# Patient Record
Sex: FEMALE | Race: WHITE | NOT HISPANIC OR LATINO | Employment: FULL TIME | URBAN - NONMETROPOLITAN AREA
[De-identification: names, ages, dates, MRNs, and addresses within clinical notes are randomized per-mention and may not be internally consistent; named-entity substitution may affect disease eponyms.]

---

## 2023-08-18 ENCOUNTER — OFFICE VISIT (OUTPATIENT)
Dept: OBSTETRICS AND GYNECOLOGY | Facility: CLINIC | Age: 33
End: 2023-08-18
Payer: COMMERCIAL

## 2023-08-18 VITALS
BODY MASS INDEX: 27.44 KG/M2 | WEIGHT: 196 LBS | SYSTOLIC BLOOD PRESSURE: 118 MMHG | HEIGHT: 71 IN | DIASTOLIC BLOOD PRESSURE: 64 MMHG

## 2023-08-18 DIAGNOSIS — Z30.09 ENCOUNTER FOR FEMALE FAMILY PLANNING COUNSELING: Primary | ICD-10-CM

## 2023-08-18 DIAGNOSIS — N92.1 MENORRHAGIA WITH IRREGULAR CYCLE: ICD-10-CM

## 2023-08-18 DIAGNOSIS — N94.6 DYSMENORRHEA: ICD-10-CM

## 2023-08-18 DIAGNOSIS — Z80.41 FAMILY HISTORY OF OVARIAN CANCER: ICD-10-CM

## 2023-08-18 PROCEDURE — 99204 OFFICE O/P NEW MOD 45 MIN: CPT | Performed by: OBSTETRICS & GYNECOLOGY

## 2023-08-18 RX ORDER — CETIRIZINE HYDROCHLORIDE 10 MG/1
CAPSULE, LIQUID FILLED ORAL
COMMUNITY
Start: 2020-01-01

## 2023-08-21 NOTE — PROGRESS NOTES
Chief Complaint  Consult (Patient wants to discuss family planning. )     History of Present Illness:  Patient is 33 y.o.  who presents to [facility] here as a new patient for discussion of family-planning.  Patient had previously been on Tri-Sprintec for 8 to 9 years.  She has been off of it now for the last 2 years.  She reports her menstrual cycles occur monthly.  She reports they will vary however within a few days.  They are heavy lasting for 5 days.  She does pass some clots.  She also has cramping associated with her menstrual cycles.  She has been trying to conceive since April of this year.  She has done urine ovulatory kits for 2 months.  She does have positive molimina.  She did have recent labs with her primary care provider within the last year.  She is uncertain exactly what labs were checked.  Her significant other is 31 years of age.  He is healthy and has not fathered any children however.  He is on medication for decreased libido.  The patient's last menstrual cycle started on August 15.  She is on her menstrual cycle today.  She reports her last Pap smear was 2021.  She reports it was normal.  Patient does have a family history of ovarian cancer in her mother who was diagnosed with stage Ia ovarian cancer in 2018.    History  Past Medical History:   Diagnosis Date    Abnormal ECG     Dx with POTS    Anxiety     Anxiety    Asthma     Allergy related    Hyperlipidemia     Mild elevation; will bring medical record    Migraine 2022    Last noted 2023    Ovarian cyst 2012    PMS (premenstrual syndrome)     Urinary tract infection     Until 2 years d/t swim and oral contraceptives     Current Outpatient Medications on File Prior to Visit   Medication Sig Dispense Refill    Cetirizine HCl (ZyrTEC Allergy) 10 MG capsule       ubrogepant (Ubrelvy) 100 MG tablet Take 1 tablet at onset of headache, may repeat dose x1 after  "2h.Max: 200 mg/24h.       No current facility-administered medications on file prior to visit.     No Known Allergies  Past Surgical History:   Procedure Laterality Date    WISDOM TOOTH EXTRACTION  2014     Family History   Problem Relation Age of Onset    Ovarian cancer Mother         Tumor removed in 2018; no relapses    Hypertension Mother     Diabetes Father     Hypertension Father     Diabetes Maternal Grandmother     Hypertension Maternal Grandmother     Diabetes Paternal Grandmother     Hypertension Paternal Grandmother     Hypertension Maternal Aunt     Hypertension Paternal Uncle      Social History     Socioeconomic History    Marital status:    Tobacco Use    Smoking status: Never   Substance and Sexual Activity    Alcohol use: Yes     Alcohol/week: 1.0 standard drink     Types: 1 Drinks containing 0.5 oz of alcohol per week     Comment: Maybe 1-2 drinks monthly    Drug use: Never    Sexual activity: Yes     Partners: Male     Birth control/protection: None     Comment: For last four months       Physical Examination:  Vital Signs: /64   Ht 180.3 cm (71\")   Wt 88.9 kg (196 lb)   BMI 27.34 kg/mý     General Appearance: alert, appears stated age, and cooperative  Breasts: Not performed.  Abdomen: no masses, no hepatomegaly, no splenomegaly, soft non-tender, no guarding, and no rebound tenderness  Pelvic: Not performed.    Data Review:  The following data was reviewed by: Maritza Tracey MD on 08/18/2023:     Labs:    Imaging:    Medical Records:  Patient is to sign to obtain a copy of her lab results.    Assessment and Plan   1. Encounter for female family planning counseling  I have discussed with the patient family-planning.  Patient has been informed in timing of intercourse.  Patient will continue with her urine ovulatory kits.  She will follow-up with a serum progesterone level.  We will also schedule transvaginal ultrasound.  Patient is instructed in a prenatal vitamin or multivitamin " with folic acid.  Instructions and precautions have been given.  - US Non-ob Transvaginal; Future  - Progesterone; Future    2. Menorrhagia with irregular cycle  Patient with menorrhagia and irregular cycles.  Patient will follow-up with labs as noted.  She is also designed to obtain a copy of her recent medical records.  - US Non-ob Transvaginal; Future  - Progesterone; Future  - CBC & Differential  - TSH    3. Dysmenorrhea  Johnny Dee was counseled regarding the various etiologies for dysmenorrhea.  The patient was informed that primary dysmenorrhea is painful menstruation in the absence of pathology.  The various options for dysmenorrhea were discussed to include nonsteroidal antiinflammatory drugs, hormonal suppression, or both.  The patient was informed secondary dysmenorrhea is a result of pelvic pathology and is more common in patients with severe dysmenorrhea at menarche or progressively worsening dysmenorrhea, abnormal uterine bleeding, mid-cycle or acyclic pain, infertility, family history of endometriosis, dyspareunia, or lack of response to empiric therapy.  Evaluation for secondary causes includes pelvic ultrasonography and possible laparoscopy.  The various treatment options for secondary dysmenorrhea depends upon the etiology as discussed.   Transvaginal ultrasound as noted.  - US Non-ob Transvaginal; Future    4. Family history of ovarian cancer  We will schedule transvaginal ultrasound to evaluate ovaries as discussed.  - US Non-ob Transvaginal; Future    Follow Up/Instructions:  Follow up as noted.  Patient was given instructions and counseling regarding her condition or for health maintenance advice. Please see specific information pulled into the AVS if appropriate.     Note: Speech recognition transcription software may have been used to dictate portions of this document.  An attempt at proofreading has been made though minor errors in transcription may still be present.    This note was  electronically signed.  Maritza Tracey M.D.

## 2023-09-01 ENCOUNTER — TELEPHONE (OUTPATIENT)
Dept: OBSTETRICS AND GYNECOLOGY | Facility: CLINIC | Age: 33
End: 2023-09-01
Payer: COMMERCIAL

## 2023-09-01 NOTE — TELEPHONE ENCOUNTER
----- Message from Maritza Tracey MD sent at 9/1/2023 12:46 PM EDT -----  Please inform patient I have reviewed her transvaginal ultrasound.  The uterus appears normal.  It does appear to be heart-shaped.  This most likely will not affect fertility.  Patient needs to follow-up however if no conception in 6 months.  Her ovaries appear normal and no abnormalities noted otherwise.  Thank you.

## 2023-09-06 LAB
BASOPHILS # BLD AUTO: 0.02 10*3/MM3 (ref 0–0.2)
BASOPHILS NFR BLD AUTO: 0.3 % (ref 0–1.5)
EOSINOPHIL # BLD AUTO: 0.17 10*3/MM3 (ref 0–0.4)
EOSINOPHIL NFR BLD AUTO: 2.7 % (ref 0.3–6.2)
ERYTHROCYTE [DISTWIDTH] IN BLOOD BY AUTOMATED COUNT: 12.7 % (ref 12.3–15.4)
HCT VFR BLD AUTO: 39.9 % (ref 34–46.6)
HGB BLD-MCNC: 13.4 G/DL (ref 12–15.9)
IMM GRANULOCYTES # BLD AUTO: 0.01 10*3/MM3 (ref 0–0.05)
IMM GRANULOCYTES NFR BLD AUTO: 0.2 % (ref 0–0.5)
LYMPHOCYTES # BLD AUTO: 2.41 10*3/MM3 (ref 0.7–3.1)
LYMPHOCYTES NFR BLD AUTO: 38.1 % (ref 19.6–45.3)
MCH RBC QN AUTO: 28.8 PG (ref 26.6–33)
MCHC RBC AUTO-ENTMCNC: 33.6 G/DL (ref 31.5–35.7)
MCV RBC AUTO: 85.8 FL (ref 79–97)
MONOCYTES # BLD AUTO: 0.48 10*3/MM3 (ref 0.1–0.9)
MONOCYTES NFR BLD AUTO: 7.6 % (ref 5–12)
NEUTROPHILS # BLD AUTO: 3.23 10*3/MM3 (ref 1.7–7)
NEUTROPHILS NFR BLD AUTO: 51.1 % (ref 42.7–76)
NRBC BLD AUTO-RTO: 0 /100 WBC (ref 0–0.2)
PLATELET # BLD AUTO: 183 10*3/MM3 (ref 140–450)
PROGEST SERPL-MCNC: 12.8 NG/ML
RBC # BLD AUTO: 4.65 10*6/MM3 (ref 3.77–5.28)
TSH SERPL DL<=0.005 MIU/L-ACNC: 4.66 UIU/ML (ref 0.27–4.2)
WBC # BLD AUTO: 6.32 10*3/MM3 (ref 3.4–10.8)

## 2023-09-07 DIAGNOSIS — R79.89 ABNORMAL SERUM THYROXINE (T4) LEVEL: Primary | ICD-10-CM

## 2023-10-04 LAB
T3 SERPL-MCNC: 118 NG/DL (ref 71–180)
T3FREE SERPL-MCNC: 2.9 PG/ML (ref 2–4.4)